# Patient Record
Sex: FEMALE | Race: WHITE
[De-identification: names, ages, dates, MRNs, and addresses within clinical notes are randomized per-mention and may not be internally consistent; named-entity substitution may affect disease eponyms.]

---

## 2018-01-12 NOTE — RESULT NOTES
Message   PLEASE CALL ROLY BARRERA BW RESULTS   ALL LOOKED PRETTY GOOD   LETS TALK ABOUT WHERE TO GO FROM HERE WHEN SHE COMES IN   THANKS     Verified Results  (1) COMPREHENSIVE METABOLIC PANEL [id number] 19:32OC Pati Grate     Test Name Result Flag Reference   Glucose, Serum 82 mg/dL  65-99   BUN 10 mg/dL  6-24   Creatinine, Serum 0 70 mg/dL  0 57-1 00   eGFR If NonAfricn Am 100 mL/min/1 73  >59   eGFR If Africn Am 115 mL/min/1 73  >59   BUN/Creatinine Ratio 14  9-23   Sodium, Serum 136 mmol/L  134-144   Potassium, Serum 4 1 mmol/L  3 5-5 2   Chloride, Serum 94 mmol/L L    Carbon Dioxide, Total 22 mmol/L  18-29   Calcium, Serum 9 9 mg/dL  8 7-10 2   Protein, Total, Serum 7 5 g/dL  6 0-8 5   Albumin, Serum 5 0 g/dL  3 5-5 5   Globulin, Total 2 5 g/dL  1 5-4 5   A/G Ratio 2 0  1 1-2 5   Bilirubin, Total 0 7 mg/dL  0 0-1 2   Alkaline Phosphatase, S 50 IU/L     AST (SGOT) 42 IU/L H 0-40   ALT (SGPT) 27 IU/L  0-32     (1) C-REACTIVE PROTEIN [id number] 12:00AM Pati [employer]     Test Name Result Flag Reference   C-Reactive Protein, Quant 2 6 mg/L  0 0-4 9     (1) HIV AG/AB Giselle Morales GEN [id number] 12:00AM [employer]     Test Name Result Flag Reference   HIV Screen 4th Generation wRfx HIV 1+2 Ab+HIV1 p24 Ag Non Reactive  Non Reactive     (1) TSH [id number] 12:00AM Pati Grate     Test Name Result Flag Reference   TSH 1 580 uIU/mL  0 450-4 500     (1) HEMOGLOBIN A1C [id number] 12:00AM Pati Grate     Test Name Result Flag Reference   Hemoglobin A1c 5 2 %  4 8-5 6   Pre-diabetes: 5 7 - 6 4           Diabetes: >6 4           Glycemic control for adults with diabetes: <7 0     (LC) Rheumatoid Arthritis Factor [id number] 12:00AM Pati Grate     Test Name Result Flag Reference   RA Latex Turbid   3 3 IU/mL  0 0-13 9     (LC) CBC/Differential (No Platelet) [id number] 82:97DH Pati Grate     Test Name Result Flag Reference   WBC 4 8 x10E3/uL  3 4-10 8   RBC 4 73 x10E6/uL  3 77-5 28   Hemoglobin 16 0 g/dL H 11 1-15 9   Hematocrit 45 9 %  34 0-46  6   MCV 97 fL  79-97   MCH 33 8 pg H 26 6-33 0   MCHC 34 9 g/dL  31 5-35 7   RDW 12 8 %  12 3-15 4   Neutrophils 59 %     Lymphs 27 %     Monocytes 10 %     Eos 3 %     Basos 1 %     Neutrophils (Absolute) 2 8 x10E3/uL  1 4-7 0   Lymphs (Absolute) 1 3 x10E3/uL  0 7-3 1   Monocytes(Absolute) 0 5 x10E3/uL  0 1-0 9   Eos (Absolute) 0 1 x10E3/uL  0 0-0 4   Baso (Absolute) 0 1 x10E3/uL  0 0-0 2   Immature Granulocytes 0 %     Immature Grans (Abs) 0 0 x10E3/uL  0 0-0 1     (LC) Lipid Panel [id number] 12:00AM Kit Saleh     Test Name Result Flag Reference   Cholesterol, Total 199 mg/dL  100-199   Triglycerides 60 mg/dL  0-149   HDL Cholesterol 122 mg/dL  >39   Results confirmed on  dilution  According to ATP-III Guidelines, HDL-C >59 mg/dL is considered a  negative risk factor for CHD  VLDL Cholesterol Carter 12 mg/dL  5-40   LDL Cholesterol Calc 65 mg/dL  0-99     (LC) Lyme, Western Blot, Serum [id number] 12:00AM Kit Saleh     Test Name Result Flag Reference   IgG P93 Ab  Absent     IgG P66 Ab  Absent     IgG P58 Ab  Absent     IgG P45 Ab  Absent     IgG P41 Ab  Absent     IgG P39 Ab  Absent     IgG P30 Ab  Absent     IgG P28 Ab  Absent     IgG P23 Ab  Absent     IgG P18 Ab  Absent     Lyme IgG WB Interp  Negative     Positive: 5 of the following                                                Borrelia-specific bands:                                                18,23,28,30,39,41,45,58,                                                66, and 93  Negative: No bands or banding                                                patterns which do not                                                meet positive criteria  IgM P41 Ab  Absent     IgM P39 Ab  Absent     IgM P23 Ab  Absent     Lyme IgM WB Interp  Negative     Note: An equivocal or positive EIA result followed by a negative  Western Blot result is considered NEGATIVE   An equivocal or positive  EIA result followed by a positive Western Blot is considered POSITIVE  by the CDC  Positive: 2 of the following bands: 23,39 or 41  Negative: No bands or banding patterns which do not meet positive  criteria  Criteria for positivity are those recommended by CDC/ASTPHLD   p23=Osp C, a47=zvfwqmdeb  Note:  Sera from individuals with the following may cross react in the  Lyme Western Blot assays: other spirochetal diseases (periodontal  disease, leptospirosis, relapsing fever, yaws, and pinta);  connective autoimmune (Rheumatoid Arthritis and Systemic Lupus  Erythematosus and also individuals with Antinuclear Antibody);  other infections COFFEE [facility] Spotted Fever; Olga-Barr Virus,  and Cytomegalovirus)  (LC) Antinuclear Antibodies, IFA [id number] 12:00AM Layo Kohler     Test Name Result Flag Reference   Antinuclear Antibodies, IFA See patterns     Negative   <1:80                                                      Borderline  1:80                                                      Positive   >1:80   Homogeneous Pattern 1:160 H    Note: Comment     A positive RADHA result may occur in healthy individuals or  be associated with a variety of diseases    See interpre-  tation below:  Pattern      Antigen Detected  Suggested Disease Association  -----------  ----------------  -----------------------------  Homogeneous  DNA(ds,ss,),      High titers - SLE  (Smooth)     Histone  -----------  ----------------  -----------------------------  Speckled     Sm, RNP, SCL-70,  SLE,MCTD,Scleroderma,Sjogrens               SS-A/SS-B  -----------  ----------------  -----------------------------  Nucleolar    SCL-70, PM-1/SCL  High titers Scleroderma Poly-                                 myositis/Scleroderma Overlap  -----------  ----------------  -----------------------------  Centromere   Centromere        PSS w/Crest syndrome variable  -----------  ---------------- ------------------------------

## 2018-01-14 NOTE — MISCELLANEOUS
Message  Cruz Rivera and I had a lengthy conversation over telephone  All recent bloodwork was normal  She questions why her arthritis is flaring up, nose runny, mouth dry  There issues have been present for past 5 months and have not been responsive to allergy medications, advair, albuterol, steroids, NSAIDs  I advised her to take a look at her environmental exposures  Possible mold problem in home or work place  We talk about rheumatology evaluation which she refuses at this time  I will ask Dr Christopher Pantoja to review recent studies  Cruz Rivera will consider OV next week to discuss other options        Signatures   Electronically signed by : KRISTYN Arthur; Mar 16 2016  9:37AM EST                       (Author)

## 2018-01-16 NOTE — MISCELLANEOUS
Message  Beryle Dolores and I spoke about other options  We discussed possible antidepressive which she wants to hold off on for now  We discussed second opinion with Dr Gisel Prasad regarding runny nose and clavicular lump with abnormal biopsy  She is agreeable to that and we'll schedule to make an appointment  She wants to hold off her rheumatology evaluation for now  She reports her symptoms are improving largely  She does feel there may be an environmental component as she lives in a 8-year-old house  She acknowledges that there could be molds in the basement  She is going to investigate that further with possible mold mold testing  We will discuss her decisions in one week        Plan  Acute pain of right knee    · Hydrocodone-Acetaminophen 5-325 MG Oral Tablet; TAKE 1 TABLET Bedtime  PRN severe knee pain    Signatures   Electronically signed by : KRISTYN Alexander; Mar 25 2016 11:31AM EST                       (Author)
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
## 2018-01-18 NOTE — MISCELLANEOUS
Message  I spoke with Henri Meek regarding recent orthopedic consults (Shoaib Devi and Caprice) RE: left sternoclavicular mass  She tells me that they both basically diagnosed the condition as arthritis and do not recommend further testing  She hand carried all reports from recent testing which both physicians have reviewed per the patient  I urged her to consider excisional biopsy of the mass as recommended by the pathologist as a result of needle biopsy which showed atypical spindle cells  She verbalized confidence in the diagnosis of the orthopedic surgeons  She verbalizes desire to hold off on further testing and would like to monitor  If it grows suddenly, she will seek further testing  Patient verbalizes understanding of consequences of watchful waiting versus further testing  She will call if she changes her mind        Signatures   Electronically signed by : KRISTYN Henriquez; Mar  3 2016  9:21AM EST                       (Author)
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
## 2020-01-08 ENCOUNTER — HOSPITAL ENCOUNTER (EMERGENCY)
Dept: HOSPITAL 25 - UCCORT | Age: 55
Discharge: HOME | End: 2020-01-08
Payer: COMMERCIAL

## 2020-01-08 VITALS — DIASTOLIC BLOOD PRESSURE: 85 MMHG | SYSTOLIC BLOOD PRESSURE: 135 MMHG

## 2020-01-08 DIAGNOSIS — J40: Primary | ICD-10-CM

## 2020-01-08 DIAGNOSIS — Z91.02: ICD-10-CM

## 2020-01-08 DIAGNOSIS — Z91.09: ICD-10-CM

## 2020-01-08 PROCEDURE — G0463 HOSPITAL OUTPT CLINIC VISIT: HCPCS

## 2020-01-08 PROCEDURE — 99202 OFFICE O/P NEW SF 15 MIN: CPT

## 2020-01-08 NOTE — UC
Respiratory Complaint HPI





- HPI Summary


HPI Summary: 





Cough x 1 week


cough is productive with yellow sputum 


+ nasal congestion , pnd, fever, chills 


wheezing with chest tightness


otc meds are not helping 








- History of Current Complaint


Chief Complaint: UCRespiratory


Stated Complaint: COUGH,HOT/COLD SWEATS


Time Seen by Provider: 01/08/20 13:13


Hx Obtained From: Patient


Onset/Duration: Gradual Onset, Lasting Days - 7, Still Present


Timing: Constant


Severity Initially: Moderate


Severity Currently: Moderate


Pain Intensity: 0


Character: Cough: Productive


Aggravating Factors: Exertion, Deep Breaths


Alleviating Factors: Nothing


Associated Signs And Symptoms: Positive: Wheezing, URI, Nasal Congestion.  

Negative: Dyspnea, Fever, Chills, Dizziness, Calf Pain, Calf Swelling





- Allergies/Home Medications


Allergies/Adverse Reactions: 


 Allergies











Allergy/AdvReac Type Severity Reaction Status Date / Time


 


Bleach (Sodium Hypochlorite) Allergy  Shortness Verified 01/08/20 13:11





   of Breath,  





   Nausea and  





   Vomiting,  





   Bloody Nose  


 


red (food color) Allergy  Rash Verified 01/08/20 13:11











Home Medications: 


 Home Medications





Ibuprofen TAB* [Advil TAB*] 400 mg PO Q6H PRN 01/08/20 [History Confirmed 01/08/ 20]











PMH/Surg Hx/FS Hx/Imm Hx


Previously Healthy: Yes





- Surgical History


Surgical History: Yes


Surgery Procedure, Year, and Place: Pelvic Mesh, 2006, [place]; Left Breast Benign 

Lumpectomy, ~2004, [place]; Abdominal Surgery Born Premature





- Family History


Known Family History: Positive: Non-Contributory





- Social History


Alcohol Use: None


Substance Use Type: None


Smoking Status (MU): Never Smoked Tobacco





Review of Systems


All Other Systems Reviewed And Are Negative: Yes


Is Patient Immunocompromised?: No





Physical Exam


Triage Information Reviewed: Yes


Appearance: Well-Appearing, No Pain Distress, Well-Nourished


Vital Signs: 


 Initial Vital Signs











Temp  98.2 F   01/08/20 13:08


 


Pulse  62   01/08/20 13:08


 


Resp  16   01/08/20 13:08


 


BP  135/85   01/08/20 13:08


 


Pulse Ox  100   01/08/20 13:08











Vital Signs Reviewed: Yes


Eye Exam: Normal


Eyes: Positive: Conjunctiva Clear


ENT: Positive: Normal ENT inspection, Hearing grossly normal, Pharynx normal, 

Nasal congestion, TMs normal.  Negative: Nasal drainage, Tonsillar swelling, 

Tonsillar exudate


Neck exam: Normal


Neck: Positive: Supple, Nontender, No Lymphadenopathy


Respiratory: Positive: Chest non-tender, Lungs clear, Normal breath sounds


Cardiovascular: Positive: RRR, No Murmur, Pulses Normal


Skin Exam: Normal





Respiratory Course/Dx





- Differential Dx/Diagnosis


Provider Diagnosis: 


 Bronchitis








Discharge ED





- Sign-Out/Discharge


Documenting (check all that apply): Patient Departure


All imaging exams completed and their final reports reviewed: No Studies





- Discharge Plan


Condition: Stable


Disposition: HOME


Prescriptions: 


Albuterol HFA INHALER* [Ventolin HFA Inhaler*] 2 puff INH Q6H PRN #1 mdi


 PRN Reason: Wheezing


Azithromycin TAB* [Zithromax TAB (Z-CARINE) 250 mg #6 tabs] 2 tab PO .TODAY, THEN 

1 DAILY #1 carine


Patient Education Materials:  Acute Bronchitis (ED)


Referrals: 


Katiana Witt MD [Primary Care Provider] - 7 Days





- Billing Disposition and Condition


Condition: STABLE


Disposition: Home

## 2020-03-16 ENCOUNTER — HOSPITAL ENCOUNTER (EMERGENCY)
Dept: HOSPITAL 25 - UCCORT | Age: 55
Discharge: HOME | End: 2020-03-16
Payer: COMMERCIAL

## 2020-03-16 VITALS — DIASTOLIC BLOOD PRESSURE: 92 MMHG | SYSTOLIC BLOOD PRESSURE: 156 MMHG

## 2020-03-16 DIAGNOSIS — M79.10: ICD-10-CM

## 2020-03-16 DIAGNOSIS — R05: ICD-10-CM

## 2020-03-16 DIAGNOSIS — R09.81: ICD-10-CM

## 2020-03-16 DIAGNOSIS — R50.9: ICD-10-CM

## 2020-03-16 DIAGNOSIS — R51: ICD-10-CM

## 2020-03-16 DIAGNOSIS — R09.89: Primary | ICD-10-CM

## 2020-03-16 DIAGNOSIS — Z91.09: ICD-10-CM

## 2020-03-16 DIAGNOSIS — Z91.02: ICD-10-CM

## 2020-03-16 DIAGNOSIS — F17.210: ICD-10-CM

## 2020-03-16 PROCEDURE — G0463 HOSPITAL OUTPT CLINIC VISIT: HCPCS

## 2020-03-16 PROCEDURE — 99211 OFF/OP EST MAY X REQ PHY/QHP: CPT

## 2020-03-16 NOTE — UC
FLU HPI





- HPI Summary


HPI Summary: 





This 54-year-old female with flulike symptoms over the past 2 days.  She had a 

sore throat the first day but that has resolved.  She is a smoker.





- History of Current Complaint


Chief Complaint: UCRespiratory


Stated Complaint: FLU SYMP


Time Seen by Provider: 03/16/20 12:32


Hx Obtained From: Patient


Pregnant?: No


Onset/Duration: Gradual Onset, Lasting Days


Severity Currently: Mild


Severity Initially: Mild


Pain Intensity: 0


Associated Signs & Symptoms: Positive: Fever, Myalgia, Cough, Sore Throat, 

Nasal Congestion, Headache


Related Hx: Possible Flu/Infectious Exposure -  She works at [employer]





- Allergy/Home Medications


Allergies/Adverse Reactions: 


 Allergies











Allergy/AdvReac Type Severity Reaction Status Date / Time


 


Bleach (Sodium Hypochlorite) Allergy  Shortness Verified 03/16/20 12:22





   of Breath,  





   Nausea and  





   Vomiting,  





   Bloody Nose  


 


red (food color) Allergy  Rash Verified 03/16/20 12:22











Home Medications: 


 Home Medications





Albuterol HFA INHALER* [Ventolin HFA Inhaler*] 2 puff INH Q6H PRN #1 mdi 01/08/ 20 [Rx Confirmed 03/16/20]


Ibuprofen TAB* [Advil TAB*] 400 mg PO Q6H PRN 01/08/20 [History Confirmed 03/16/ 20]


D-Methorphan/PE/Acetaminophen [Theraflu Expressmax Sever 20- mg/30Ml] 1 

liq PO BID PRN 03/16/20 [History Confirmed 03/16/20]











PMH/Surg Hx/FS Hx/Imm Hx


Previously Healthy: Yes





- Surgical History


Surgical History: Yes


Surgery Procedure, Year, and Place: Pelvic Mesh, 2006, [place]; Left Breast Benign 

Lumpectomy, ~2004, [place]; Abdominal Surgery Born Premature





- Family History


Known Family History: Positive: Unknown, Non-Contributory





- Social History


Occupation: Employed Full-time


Alcohol Use: Occasionally


Substance Use Type: None


Smoking Status (MU): Heavy Every Day Tobacco Smoker


Type: Cigarettes


Amount Used/How Often: 1/2 PPD





Review of Systems


All Other Systems Reviewed And Are Negative: Yes


Constitutional: Positive: Fever, Chills


ENT: Positive: Sore Throat - Sore throat the first day but no longer, Nasal 

Discharge


Respiratory: Positive: Cough - Nonproductive cough


Musculoskeletal: Positive: Myalgia


Neurological/Mental Status: Positive: Headache


Is Patient Immunocompromised?: No





Physical Exam


Triage Information Reviewed: Yes


Appearance: Well-Appearing, No Pain Distress, Well-Nourished


Vital Signs: 


 Initial Vital Signs











Temp  98.7 F   03/16/20 12:24


 


Pulse  74   03/16/20 12:24


 


Resp  16   03/16/20 12:24


 


BP  156/92   03/16/20 12:24


 


Pulse Ox  100   03/16/20 12:24











Vital Signs Reviewed: Yes


Eyes: Positive: Conjunctiva Clear


ENT: Positive: Pharynx normal, Nasal drainage - Clear nasal coryza, TMs normal, 

Uvula midline.  Negative: Trismus, Muffled voice, Hoarse voice


Neck: Positive: Supple, Nontender, No Lymphadenopathy


Respiratory: Positive: Lungs clear, Normal breath sounds, No respiratory 

distress, No accessory muscle use


Cardiovascular: Positive: RRR, No Murmur, Pulses Normal, Brisk Capillary Refill


Musculoskeletal Exam: Normal


Neurological Exam: Normal


Psychological Exam: Normal


Skin Exam: Normal





Flu Course/Dx





- Course


Course Of Treatment: 





Rapid flu test: Negative





Patient is comfortable here and nontoxic.





- Differential Dx/Diagnosis


Provider Diagnosis: 


 Flu-like symptoms








Discharge ED





- Sign-Out/Discharge


Documenting (check all that apply): Patient Departure


All imaging exams completed and their final reports reviewed: No Studies





- Discharge Plan


Condition: Fair


Disposition: HOME


Patient Education Materials:  Influenza (DC)


Forms:  *Work Release


Referrals: 


Katiana Witt MD [Primary Care Provider] - 


Additional Instructions: 


Increase fluids, rest, continue your cold medicine as we discussed.  Definite 

follow-up with your primary care provider if no improvement in 3 or 4 days.





- Billing Disposition and Condition


Condition: FAIR


Disposition: Home